# Patient Record
Sex: MALE | Race: WHITE | NOT HISPANIC OR LATINO | URBAN - METROPOLITAN AREA
[De-identification: names, ages, dates, MRNs, and addresses within clinical notes are randomized per-mention and may not be internally consistent; named-entity substitution may affect disease eponyms.]

---

## 2018-10-15 ENCOUNTER — INPATIENT (INPATIENT)
Facility: HOSPITAL | Age: 53
LOS: 1 days | Discharge: HOME | End: 2018-10-17
Attending: UROLOGY | Admitting: UROLOGY
Payer: COMMERCIAL

## 2018-10-15 VITALS
OXYGEN SATURATION: 98 % | HEART RATE: 83 BPM | RESPIRATION RATE: 18 BRPM | DIASTOLIC BLOOD PRESSURE: 103 MMHG | SYSTOLIC BLOOD PRESSURE: 177 MMHG | HEIGHT: 73 IN | TEMPERATURE: 98 F | WEIGHT: 274.92 LBS

## 2018-10-15 LAB
ALBUMIN SERPL ELPH-MCNC: 4.8 G/DL — SIGNIFICANT CHANGE UP (ref 3.5–5.2)
ALP SERPL-CCNC: 85 U/L — SIGNIFICANT CHANGE UP (ref 30–115)
ALT FLD-CCNC: 85 U/L — HIGH (ref 0–41)
ANION GAP SERPL CALC-SCNC: 13 MMOL/L — SIGNIFICANT CHANGE UP (ref 7–14)
APPEARANCE UR: CLEAR — SIGNIFICANT CHANGE UP
AST SERPL-CCNC: 51 U/L — HIGH (ref 0–41)
BACTERIA # UR AUTO: ABNORMAL
BASOPHILS # BLD AUTO: 0.02 K/UL — SIGNIFICANT CHANGE UP (ref 0–0.2)
BASOPHILS NFR BLD AUTO: 0.2 % — SIGNIFICANT CHANGE UP (ref 0–1)
BILIRUB SERPL-MCNC: 1.2 MG/DL — SIGNIFICANT CHANGE UP (ref 0.2–1.2)
BILIRUB UR-MCNC: NEGATIVE — SIGNIFICANT CHANGE UP
BUN SERPL-MCNC: 23 MG/DL — HIGH (ref 10–20)
CALCIUM SERPL-MCNC: 9.6 MG/DL — SIGNIFICANT CHANGE UP (ref 8.5–10.1)
CHLORIDE SERPL-SCNC: 98 MMOL/L — SIGNIFICANT CHANGE UP (ref 98–110)
CO2 SERPL-SCNC: 25 MMOL/L — SIGNIFICANT CHANGE UP (ref 17–32)
COLOR SPEC: YELLOW — SIGNIFICANT CHANGE UP
CREAT SERPL-MCNC: 1.5 MG/DL — SIGNIFICANT CHANGE UP (ref 0.7–1.5)
DIFF PNL FLD: ABNORMAL
EOSINOPHIL # BLD AUTO: 0.04 K/UL — SIGNIFICANT CHANGE UP (ref 0–0.7)
EOSINOPHIL NFR BLD AUTO: 0.4 % — SIGNIFICANT CHANGE UP (ref 0–8)
GLUCOSE SERPL-MCNC: 111 MG/DL — HIGH (ref 70–99)
GLUCOSE UR QL: NEGATIVE MG/DL — SIGNIFICANT CHANGE UP
HCT VFR BLD CALC: 43.9 % — SIGNIFICANT CHANGE UP (ref 42–52)
HGB BLD-MCNC: 15.1 G/DL — SIGNIFICANT CHANGE UP (ref 14–18)
IMM GRANULOCYTES NFR BLD AUTO: 0.4 % — HIGH (ref 0.1–0.3)
KETONES UR-MCNC: ABNORMAL
LACTATE SERPL-SCNC: 1 MMOL/L — SIGNIFICANT CHANGE UP (ref 0.5–2.2)
LEUKOCYTE ESTERASE UR-ACNC: NEGATIVE — SIGNIFICANT CHANGE UP
LIDOCAIN IGE QN: 18 U/L — SIGNIFICANT CHANGE UP (ref 7–60)
LYMPHOCYTES # BLD AUTO: 0.82 K/UL — LOW (ref 1.2–3.4)
LYMPHOCYTES # BLD AUTO: 8.8 % — LOW (ref 20.5–51.1)
MCHC RBC-ENTMCNC: 29.5 PG — SIGNIFICANT CHANGE UP (ref 27–31)
MCHC RBC-ENTMCNC: 34.4 G/DL — SIGNIFICANT CHANGE UP (ref 32–37)
MCV RBC AUTO: 85.9 FL — SIGNIFICANT CHANGE UP (ref 80–94)
MONOCYTES # BLD AUTO: 0.79 K/UL — HIGH (ref 0.1–0.6)
MONOCYTES NFR BLD AUTO: 8.4 % — SIGNIFICANT CHANGE UP (ref 1.7–9.3)
NEUTROPHILS # BLD AUTO: 7.65 K/UL — HIGH (ref 1.4–6.5)
NEUTROPHILS NFR BLD AUTO: 81.8 % — HIGH (ref 42.2–75.2)
NITRITE UR-MCNC: NEGATIVE — SIGNIFICANT CHANGE UP
NRBC # BLD: 0 /100 WBCS — SIGNIFICANT CHANGE UP (ref 0–0)
PH UR: 6 — SIGNIFICANT CHANGE UP (ref 5–8)
PLATELET # BLD AUTO: 172 K/UL — SIGNIFICANT CHANGE UP (ref 130–400)
POTASSIUM SERPL-MCNC: 5 MMOL/L — SIGNIFICANT CHANGE UP (ref 3.5–5)
POTASSIUM SERPL-SCNC: 5 MMOL/L — SIGNIFICANT CHANGE UP (ref 3.5–5)
PROT SERPL-MCNC: 7.8 G/DL — SIGNIFICANT CHANGE UP (ref 6–8)
PROT UR-MCNC: NEGATIVE MG/DL — SIGNIFICANT CHANGE UP
RBC # BLD: 5.11 M/UL — SIGNIFICANT CHANGE UP (ref 4.7–6.1)
RBC # FLD: 11.9 % — SIGNIFICANT CHANGE UP (ref 11.5–14.5)
RBC CASTS # UR COMP ASSIST: SIGNIFICANT CHANGE UP /HPF
SODIUM SERPL-SCNC: 136 MMOL/L — SIGNIFICANT CHANGE UP (ref 135–146)
SP GR SPEC: 1.01 — SIGNIFICANT CHANGE UP (ref 1.01–1.03)
UROBILINOGEN FLD QL: 0.2 MG/DL — SIGNIFICANT CHANGE UP (ref 0.2–0.2)
WBC # BLD: 9.36 K/UL — SIGNIFICANT CHANGE UP (ref 4.8–10.8)
WBC # FLD AUTO: 9.36 K/UL — SIGNIFICANT CHANGE UP (ref 4.8–10.8)

## 2018-10-15 RX ORDER — SODIUM CHLORIDE 9 MG/ML
1000 INJECTION INTRAMUSCULAR; INTRAVENOUS; SUBCUTANEOUS
Qty: 0 | Refills: 0 | Status: DISCONTINUED | OUTPATIENT
Start: 2018-10-15 | End: 2018-10-16

## 2018-10-15 RX ORDER — KETOROLAC TROMETHAMINE 30 MG/ML
30 SYRINGE (ML) INJECTION EVERY 8 HOURS
Qty: 0 | Refills: 0 | Status: DISCONTINUED | OUTPATIENT
Start: 2018-10-15 | End: 2018-10-16

## 2018-10-15 RX ORDER — MORPHINE SULFATE 50 MG/1
6 CAPSULE, EXTENDED RELEASE ORAL ONCE
Qty: 0 | Refills: 0 | Status: DISCONTINUED | OUTPATIENT
Start: 2018-10-15 | End: 2018-10-15

## 2018-10-15 RX ORDER — SODIUM CHLORIDE 9 MG/ML
1000 INJECTION INTRAMUSCULAR; INTRAVENOUS; SUBCUTANEOUS ONCE
Qty: 0 | Refills: 0 | Status: COMPLETED | OUTPATIENT
Start: 2018-10-15 | End: 2018-10-15

## 2018-10-15 RX ADMIN — MORPHINE SULFATE 6 MILLIGRAM(S): 50 CAPSULE, EXTENDED RELEASE ORAL at 16:57

## 2018-10-15 RX ADMIN — Medication 30 MILLIGRAM(S): at 18:32

## 2018-10-15 RX ADMIN — MORPHINE SULFATE 6 MILLIGRAM(S): 50 CAPSULE, EXTENDED RELEASE ORAL at 15:48

## 2018-10-15 RX ADMIN — SODIUM CHLORIDE 1000 MILLILITER(S): 9 INJECTION INTRAMUSCULAR; INTRAVENOUS; SUBCUTANEOUS at 18:23

## 2018-10-15 RX ADMIN — SODIUM CHLORIDE 2000 MILLILITER(S): 9 INJECTION INTRAMUSCULAR; INTRAVENOUS; SUBCUTANEOUS at 15:14

## 2018-10-15 NOTE — ED ADULT TRIAGE NOTE - CHIEF COMPLAINT QUOTE
The past few days I have had lower back pain, I have had kidney stones in the past and it feels like that. I took an oxycodone 300 at 1pm.

## 2018-10-15 NOTE — ED PROVIDER NOTE - PHYSICAL EXAMINATION
CONSTITUTIONAL: Well-developed; well-nourished; in no acute distress, nontoxic appearing  SKIN: skin exam is warm and dry,  HEAD: Normocephalic; atraumatic.  EYES:  conjunctiva and sclera clear.  ENT: MMM, no nasal congestion  NECK: Supple; non tender.  ROM intact.  CARD: S1, S2 normal, no murmur  RESP: No wheezes, rales or rhonchi. Good air movement bilaterally  ABD: soft; non-distended; non-tender. No Rebound, No guarding  EXT: no midline vertebral tenderness, no cva tenderness  NEURO: awake, alert, following commands, oriented, grossly unremarkable. No Focal deficits. GCS 15.   motor/sensaiton intact in bilateral lower ext.  PSYCH: Cooperative, appropriate. CONSTITUTIONAL: Well-developed; well-nourished; in no acute distress, nontoxic appearing  SKIN: skin exam is warm and dry,  HEAD: Normocephalic; atraumatic.  EYES:  conjunctiva and sclera clear.  ENT: MMM, no nasal congestion  NECK: Supple; non tender.  ROM intact.  CARD: S1, S2 normal, no murmur  RESP: No wheezes, rales or rhonchi. Good air movement bilaterally  ABD: soft; non-distended; non-tender. No Rebound, No guarding  :  no testicle tenderness, no scrotal erythema  EXT: no midline vertebral tenderness, no cva tenderness  NEURO: awake, alert, following commands, oriented, grossly unremarkable. No Focal deficits. GCS 15.   motor/sensaiton intact in bilateral lower ext.  PSYCH: Cooperative, appropriate.

## 2018-10-15 NOTE — H&P ADULT - NSHPLABSRESULTS_GEN_ALL_CORE
Vital Signs Last 24 Hrs  T(C): 36.9 (15 Oct 2018 14:20), Max: 36.9 (15 Oct 2018 14:20)  T(F): 98.5 (15 Oct 2018 14:20), Max: 98.5 (15 Oct 2018 14:20)  HR: 83 (15 Oct 2018 14:20) (83 - 83)  BP: 177/103 (15 Oct 2018 14:20) (177/103 - 177/103)  BP(mean): --  RR: 18 (15 Oct 2018 14:20) (18 - 18)  SpO2: 98% (15 Oct 2018 14:20) (98% - 98%)                            15.1   9.36  )-----------( 172      ( 15 Oct 2018 15:02 )             43.9       10-15    136  |  98  |  23<H>  ----------------------------<  111<H>  5.0   |  25  |  1.5    Ca    9.6      15 Oct 2018 15:02    TPro  7.8  /  Alb  4.8  /  TBili  1.2  /  DBili  x   /  AST  51<H>  /  ALT  85<H>  /  AlkPhos  85  1015              Urinalysis Basic - ( 15 Oct 2018 14:38 )    Color: Yellow / Appearance: Clear / S.010 / pH: x  Gluc: x / Ketone: Trace  / Bili: Negative / Urobili: 0.2 mg/dL   Blood: x / Protein: Negative mg/dL / Nitrite: Negative   Leuk Esterase: Negative / RBC: 1-2 /HPF / WBC x   Sq Epi: x / Non Sq Epi: x / Bacteria: Few            Lactate Trend  10-15 @ 15:02 Lactate:1.0           < from: CT Abdomen and Pelvis w/ IV Cont (10.15.18 @ 15:38) >     Delayed right nephrogram with mild right-sided perinephric   stranding and hydroureteronephrosis extending to the level of an   obstructing 8 x 6 x 10 mm right proximal ureteral calculus (approximately   375 Hounsfield units). Additional punctate nonobstructing left renal   lower pole calculus (series 4, image 188).    < end of copied text >

## 2018-10-15 NOTE — H&P ADULT - NSHPPHYSICALEXAM_GEN_ALL_CORE
Gen NAD, A&Ox3  CV +S1 and S2, RR  Resp +air entry B/L  Abd soft NT ND  Back No CVA tenderness  Ext no edema

## 2018-10-15 NOTE — H&P ADULT - HISTORY OF PRESENT ILLNESS
Pt is a 54 y/o male who presented to ED with 2 day history of right flank pain. Denies nausea/vomiting, fever/chills or other complaints.  Pt reports an isolated hx of nephrolithiasis, which he passed spontaneously without intervention. He denies urological follow up since.

## 2018-10-15 NOTE — ED PROVIDER NOTE - OBJECTIVE STATEMENT
52 yo m with pmh of kidney stones presents with right flank pain for a few days. no trauma, no fevers, no chills, no dysuria.  no abd pain, no numbness, no weakness.  no urinary complaints.  no current testicular pain.  no numbness, no weakness.  no urinary incontinence.

## 2018-10-15 NOTE — ED PROVIDER NOTE - PROGRESS NOTE DETAILS
a/p:  right flank pain.  p;  ct, labs, ivf, reassess.  pt took his own pain meds prior to arrival which has helped his pain. I spoke to urology PA who will come to see pt. pt seen by urology and accepted for admission

## 2018-10-15 NOTE — H&P ADULT - ASSESSMENT
Pt is a 52 y/o male with an obstructing Rt Proximal Ureteral Stone    Admit to Urology  IVF/Pain Management with Toradol  Strain urine  NPO p MN  For OR in am  Labs in am

## 2018-10-15 NOTE — ED ADULT NURSE NOTE - NSIMPLEMENTINTERV_GEN_ALL_ED
Implemented All Universal Safety Interventions:  [place] to call system. Call bell, personal items and telephone within reach. Instruct patient to call for assistance. Room bathroom lighting operational. Non-slip footwear when patient is off stretcher. Physically safe environment: no spills, clutter or unnecessary equipment. Stretcher in lowest position, wheels locked, appropriate side rails in place.

## 2018-10-15 NOTE — ED PROVIDER NOTE - NS ED ROS FT
Constitutional:  no fevers, no chills, no malaise  ENMT: No neck pain or stiffness, no nasal congestion, no ear pain, no throat pain  Cardiac:  No chest pain  Respiratory:  No cough or sob  GI:  see hpi  :  No dysuria, frequency or burning.  MS:  see hpi  Neuro:  No headache, no dizziness, no change in mental status  Skin:  No skin rash  Except as documented in the HPI,  all other systems are negative

## 2018-10-15 NOTE — ED ADULT NURSE REASSESSMENT NOTE - NS ED NURSE REASSESS COMMENT FT1
Notified Dr Reynolds of vital signs. Pt states his pain decreased after the morphine. No acute distress noted at this time. Will re evaluate.

## 2018-10-15 NOTE — ED PROVIDER NOTE - MEDICAL DECISION MAKING DETAILS
pt with large obstructing kidney stone.  pt seen by urology and accepted for admission to urology service.  no uti.  pt comfortable with plan.  pt admitted to urology with plan to go to OR tomorrow.  pt aware.

## 2018-10-16 LAB
ANION GAP SERPL CALC-SCNC: 14 MMOL/L — SIGNIFICANT CHANGE UP (ref 7–14)
BUN SERPL-MCNC: 21 MG/DL — HIGH (ref 10–20)
CALCIUM SERPL-MCNC: 8.8 MG/DL — SIGNIFICANT CHANGE UP (ref 8.5–10.1)
CHLORIDE SERPL-SCNC: 98 MMOL/L — SIGNIFICANT CHANGE UP (ref 98–110)
CO2 SERPL-SCNC: 23 MMOL/L — SIGNIFICANT CHANGE UP (ref 17–32)
CREAT SERPL-MCNC: 1.4 MG/DL — SIGNIFICANT CHANGE UP (ref 0.7–1.5)
GLUCOSE SERPL-MCNC: 100 MG/DL — HIGH (ref 70–99)
HCT VFR BLD CALC: 41.5 % — LOW (ref 42–52)
HGB BLD-MCNC: 13.9 G/DL — LOW (ref 14–18)
MCHC RBC-ENTMCNC: 29.3 PG — SIGNIFICANT CHANGE UP (ref 27–31)
MCHC RBC-ENTMCNC: 33.5 G/DL — SIGNIFICANT CHANGE UP (ref 32–37)
MCV RBC AUTO: 87.4 FL — SIGNIFICANT CHANGE UP (ref 80–94)
NRBC # BLD: 0 /100 WBCS — SIGNIFICANT CHANGE UP (ref 0–0)
PLATELET # BLD AUTO: 148 K/UL — SIGNIFICANT CHANGE UP (ref 130–400)
POTASSIUM SERPL-MCNC: 4.7 MMOL/L — SIGNIFICANT CHANGE UP (ref 3.5–5)
POTASSIUM SERPL-SCNC: 4.7 MMOL/L — SIGNIFICANT CHANGE UP (ref 3.5–5)
RBC # BLD: 4.75 M/UL — SIGNIFICANT CHANGE UP (ref 4.7–6.1)
RBC # FLD: 12.1 % — SIGNIFICANT CHANGE UP (ref 11.5–14.5)
SODIUM SERPL-SCNC: 135 MMOL/L — SIGNIFICANT CHANGE UP (ref 135–146)
WBC # BLD: 7.03 K/UL — SIGNIFICANT CHANGE UP (ref 4.8–10.8)
WBC # FLD AUTO: 7.03 K/UL — SIGNIFICANT CHANGE UP (ref 4.8–10.8)

## 2018-10-16 PROCEDURE — 52005 CYSTO W/URTRL CATHJ: CPT

## 2018-10-16 PROCEDURE — 74420 UROGRAPHY RTRGR +-KUB: CPT | Mod: 26

## 2018-10-16 RX ORDER — CEFAZOLIN SODIUM 1 G
2000 VIAL (EA) INJECTION
Qty: 0 | Refills: 0 | Status: COMPLETED | OUTPATIENT
Start: 2018-10-17 | End: 2018-10-17

## 2018-10-16 RX ORDER — ONDANSETRON 8 MG/1
4 TABLET, FILM COATED ORAL ONCE
Qty: 0 | Refills: 0 | Status: DISCONTINUED | OUTPATIENT
Start: 2018-10-16 | End: 2018-10-17

## 2018-10-16 RX ORDER — OXYCODONE AND ACETAMINOPHEN 5; 325 MG/1; MG/1
1 TABLET ORAL ONCE
Qty: 0 | Refills: 0 | Status: DISCONTINUED | OUTPATIENT
Start: 2018-10-16 | End: 2018-10-17

## 2018-10-16 RX ORDER — PHENAZOPYRIDINE HCL 100 MG
200 TABLET ORAL ONCE
Qty: 0 | Refills: 0 | Status: COMPLETED | OUTPATIENT
Start: 2018-10-16 | End: 2018-10-16

## 2018-10-16 RX ORDER — SODIUM CHLORIDE 9 MG/ML
1000 INJECTION, SOLUTION INTRAVENOUS
Qty: 0 | Refills: 0 | Status: DISCONTINUED | OUTPATIENT
Start: 2018-10-16 | End: 2018-10-17

## 2018-10-16 RX ORDER — HYDROMORPHONE HYDROCHLORIDE 2 MG/ML
1 INJECTION INTRAMUSCULAR; INTRAVENOUS; SUBCUTANEOUS
Qty: 0 | Refills: 0 | Status: DISCONTINUED | OUTPATIENT
Start: 2018-10-16 | End: 2018-10-17

## 2018-10-16 RX ORDER — ACETAMINOPHEN 500 MG
650 TABLET ORAL ONCE
Qty: 0 | Refills: 0 | Status: DISCONTINUED | OUTPATIENT
Start: 2018-10-16 | End: 2018-10-17

## 2018-10-16 RX ADMIN — Medication 30 MILLIGRAM(S): at 13:32

## 2018-10-16 RX ADMIN — Medication 30 MILLIGRAM(S): at 16:33

## 2018-10-16 RX ADMIN — Medication 200 MILLIGRAM(S): at 18:13

## 2018-10-16 RX ADMIN — Medication 30 MILLIGRAM(S): at 05:39

## 2018-10-16 RX ADMIN — SODIUM CHLORIDE 100 MILLILITER(S): 9 INJECTION, SOLUTION INTRAVENOUS at 18:14

## 2018-10-16 RX ADMIN — HYDROMORPHONE HYDROCHLORIDE 1 MILLIGRAM(S): 2 INJECTION INTRAMUSCULAR; INTRAVENOUS; SUBCUTANEOUS at 23:08

## 2018-10-16 RX ADMIN — HYDROMORPHONE HYDROCHLORIDE 1 MILLIGRAM(S): 2 INJECTION INTRAMUSCULAR; INTRAVENOUS; SUBCUTANEOUS at 19:40

## 2018-10-16 RX ADMIN — HYDROMORPHONE HYDROCHLORIDE 1 MILLIGRAM(S): 2 INJECTION INTRAMUSCULAR; INTRAVENOUS; SUBCUTANEOUS at 23:40

## 2018-10-16 RX ADMIN — HYDROMORPHONE HYDROCHLORIDE 1 MILLIGRAM(S): 2 INJECTION INTRAMUSCULAR; INTRAVENOUS; SUBCUTANEOUS at 20:08

## 2018-10-16 NOTE — BRIEF OPERATIVE NOTE - PROCEDURE
<<-----Click on this checkbox to enter Procedure Cystoscopy with ureteral catheterization or insertion of stent  10/16/2018  right CARMENCITAJ  Active  RIVERA

## 2018-10-16 NOTE — BRIEF OPERATIVE NOTE - POST-OP DX
Hydronephrosis with urinary obstruction due to ureteral calculus  10/16/2018    Active  Stephanie Rodrigez  Right ureteral calculus  10/16/2018    Active  Stephanie Rodrigez

## 2018-10-16 NOTE — BRIEF OPERATIVE NOTE - OPERATION/FINDINGS
cloudy blood tinged urine with hydrodrip  Grade 2 trabeculation  elevated median bar small lateral lobes Prostate about 15 grams-2 cm

## 2018-10-16 NOTE — BRIEF OPERATIVE NOTE - PRE-OP DX
Hydronephrosis concurrent with and due to calculi of kidney and ureter  10/16/2018  right  Active  Stephanie Rodrigez  Right ureteral calculus  10/16/2018    Active  Stephanie Rodrigez

## 2018-10-17 ENCOUNTER — TRANSCRIPTION ENCOUNTER (OUTPATIENT)
Age: 53
End: 2018-10-17

## 2018-10-17 VITALS — HEART RATE: 92 BPM | SYSTOLIC BLOOD PRESSURE: 160 MMHG | DIASTOLIC BLOOD PRESSURE: 87 MMHG

## 2018-10-17 DIAGNOSIS — N20.0 CALCULUS OF KIDNEY: ICD-10-CM

## 2018-10-17 DIAGNOSIS — I10 ESSENTIAL (PRIMARY) HYPERTENSION: ICD-10-CM

## 2018-10-17 DIAGNOSIS — E66.9 OBESITY, UNSPECIFIED: ICD-10-CM

## 2018-10-17 RX ORDER — AMLODIPINE BESYLATE 2.5 MG/1
1 TABLET ORAL
Qty: 14 | Refills: 0 | OUTPATIENT
Start: 2018-10-17

## 2018-10-17 RX ORDER — CEFUROXIME AXETIL 250 MG
1 TABLET ORAL
Qty: 14 | Refills: 0 | OUTPATIENT
Start: 2018-10-17 | End: 2018-10-23

## 2018-10-17 RX ORDER — AMLODIPINE BESYLATE 2.5 MG/1
5 TABLET ORAL DAILY
Qty: 0 | Refills: 0 | Status: DISCONTINUED | OUTPATIENT
Start: 2018-10-17 | End: 2018-10-17

## 2018-10-17 RX ORDER — METOPROLOL TARTRATE 50 MG
25 TABLET ORAL
Qty: 0 | Refills: 0 | Status: DISCONTINUED | OUTPATIENT
Start: 2018-10-17 | End: 2018-10-17

## 2018-10-17 RX ORDER — ACETAMINOPHEN 500 MG
2 TABLET ORAL
Qty: 0 | Refills: 0 | COMMUNITY
Start: 2018-10-17

## 2018-10-17 RX ADMIN — Medication 100 MILLIGRAM(S): at 00:50

## 2018-10-17 RX ADMIN — AMLODIPINE BESYLATE 5 MILLIGRAM(S): 2.5 TABLET ORAL at 08:24

## 2018-10-17 RX ADMIN — Medication 100 MILLIGRAM(S): at 10:19

## 2018-10-17 NOTE — PROGRESS NOTE ADULT - ASSESSMENT
patient is s/p right lithotripsy and stent placement, flowers taken out and patient urinated (reported burning sensation)  -called in for Medicine consult for new onset Hypertension  -case discussed with patient. He needs follow up in the community with PMD and BP logs for appropriate BP meds and dosasges, he may even need two BP meds, but would recommend one BP med to be started (5mg norvasc given, will increase it to 10mg); also hydralazine 25mg three times a day prn SBP >170 can be added for better BP control.  Elevated blood pressure could also be secondary to pain and other factors.   -patient is ambulating good and is awaiting to be discharged   -repeat SBP at bedside around 171.

## 2018-10-17 NOTE — PROGRESS NOTE ADULT - PROBLEM SELECTOR PLAN 1
-new onset (asymptomatic with no blurry vision/dizziness)  -Pt has not followed up with PMD  -needs close BP monitoring in the community with Bp logs  -start 10mg norvasc for now; may add hydralazine 25mg three times a day prn SBP above 170  -outpatient echocardiogram

## 2018-10-17 NOTE — PROGRESS NOTE ADULT - ASSESSMENT
POD #1 s/p right lithotripsy/ureteral stent placement; r/o new onset hypertension     - Case D/W Dr. Rodrigez: will request medicine consult in light of high BP's   - continue norvasc for now   - D/C flowers   - will D/C home when stable with PO cephalosporin; pt to call office this week for urine culture results and F/U for stent removal

## 2018-10-17 NOTE — DISCHARGE NOTE ADULT - PLAN OF CARE
resolution of pain Call office at the end of this week to follow up on urine culture results. Also make an appointment fo rnext week for stent removal better blood pressure control Please follow up with medical doctor this week to re-evaluate your new blood pressure medications. If you experience headaches or dizziness, go to ER

## 2018-10-17 NOTE — DISCHARGE NOTE ADULT - CARE PROVIDER_API CALL
Stephanie Rodrigez), Urology  [address]  [address]  [place] [address]  Phone: [phone]  Fax: [phone]    Chandu Kimble (MD), [facility]  [address] [place] [address]  Phone: [phone]  Fax: [phone]

## 2018-10-17 NOTE — PROGRESS NOTE ADULT - SUBJECTIVE AND OBJECTIVE BOX
S: Pt doing well; no complaints. Has high BP this am - started on norvasc, which was just give. Flowers in place  O; Vital Signs Last 24 Hrs  T(C): 36.2 (17 Oct 2018 06:33), Max: 36.8 (16 Oct 2018 14:59)  T(F): 97.2 (17 Oct 2018 06:33), Max: 98.3 (16 Oct 2018 17:55)  HR: 77 (17 Oct 2018 08:28) (71 - 87)  BP: 181/94 (17 Oct 2018 08:28) (152/82 - 193/96)  BP(mean): --  RR: 16 (17 Oct 2018 06:33) (10 - 22)  SpO2: 97% (16 Oct 2018 20:00) (94% - 97%)    I&O's Detail    16 Oct 2018 07:01  -  17 Oct 2018 07:00  --------------------------------------------------------  IN:    IV PiggyBack: 50 mL    lactated ringers.: 1000 mL    Oral Fluid: 120 mL  Total IN: 1170 mL    OUT:    Voided: 2675 mL (via flowers, pink tinged urine)  Total OUT: 2675 mL    Total NET: -1505 mL    exam:  lungs: cta  cvs: s1s2  abd: soft NT/ND, no Rt CVAT      labs: pending

## 2018-10-17 NOTE — PROGRESS NOTE ADULT - SUBJECTIVE AND OBJECTIVE BOX
CELIA BAUER  53y  Male      Patient is a 53y old  Male who presents with a chief complaint of Rt Flank Pain (17 Oct 2018 08:48)      INTERVAL HPI/OVERNIGHT EVENTS:  patient is s/p right lithotripsy and stent placement, flowers taken out and patient urinated (reported burning sensation)  -called in for Medicine consult for new onset Hypertension  -case discussed with patient. He needs follow up in the community with PMD and BP logs for appropriate BP meds and dosasges, he may even need two BP meds, but would recommend one BP med to be started (5mg norvasc given, will increase it to 10mg); also hydralazine 25mg three times a day prn SBP >170 can be added for better BP control.  Elevated blood pressure could also be secondary to pain and other factors.   -patient is ambulating good and is awaiting to be discharged   -repeat SBP at bedside around 171.    REVIEW OF SYSTEMS:  -denies any dizziness/blurry vision and or headaches at present     Vital Signs Last 24 Hrs  T(C): 36.2 (17 Oct 2018 06:33), Max: 36.8 (16 Oct 2018 14:59)  T(F): 97.2 (17 Oct 2018 06:33), Max: 98.3 (16 Oct 2018 17:55)  HR: 107 (17 Oct 2018 09:46) (71 - 107)  BP: 171/88 (17 Oct 2018 09:46) (152/82 - 193/96)  BP(mean): --  RR: 16 (17 Oct 2018 08:54) (10 - 22)  SpO2: 95% (17 Oct 2018 08:54) (94% - 97%)    PHYSICAL EXAM:  GENERAL: NAD, well-groomed, well-developed  HEAD:  Atraumatic, Normocephalic  EYES: EOMI, PERRLA, conjunctiva and sclera clear  NERVOUS SYSTEM:  Alert & Oriented X 4, Good concentration; Motor Strength 5/5 B/L upper and lower extremities; DTRs 2+ intact and symmetric  CHEST/LUNG: Clear to percussion bilaterally; No rales, rhonchi, wheezing, or rubs  CV/HEART: Regular rate and rhythm; No murmurs, rubs, or gallops  GI/ABDOMEN: Soft, obese abdomen   EXTREMITIES:  2+ Peripheral Pulses, No clubbing, cyanosis, or edema  SKIN: No rashes or lesions    LAB:                        13.9   7.03  )-----------( 148      ( 16 Oct 2018 07:00 )             41.5     10-16    135  |  98  |  21<H>  ----------------------------<  100<H>  4.7   |  23  |  1.4    Ca    8.8      16 Oct 2018 07:00    TPro  7.8  /  Alb  4.8  /  TBili  1.2  /  DBili  x   /  AST  51<H>  /  ALT  85<H>  /  AlkPhos  85  10-15        Daily Height in cm: 185.42 (16 Oct 2018 16:52)    Daily   CAPILLARY BLOOD GLUCOSE        Urinalysis Basic - ( 15 Oct 2018 14:38 )    Color: Yellow / Appearance: Clear / S.010 / pH: x  Gluc: x / Ketone: Trace  / Bili: Negative / Urobili: 0.2 mg/dL   Blood: x / Protein: Negative mg/dL / Nitrite: Negative   Leuk Esterase: Negative / RBC: 1-2 /HPF / WBC x   Sq Epi: x / Non Sq Epi: x / Bacteria: Few      LIVER FUNCTIONS - ( 15 Oct 2018 15:02 )  Alb: 4.8 g/dL / Pro: 7.8 g/dL / ALK PHOS: 85 U/L / ALT: 85 U/L / AST: 51 U/L / GGT: x               RADIOLOGY:    Imaging Personally Reviewed:  [ ] YES  [ ] NO    HEALTH ISSUES - PROBLEM Dx:        MEDS:  acetaminophen   Tablet .. 650 milliGRAM(s) Oral once PRN  amLODIPine   Tablet 5 milliGRAM(s) Oral daily  ceFAZolin   IVPB 2000 milliGRAM(s) IV Intermittent <User Schedule>  HYDROmorphone  Injectable 1 milliGRAM(s) IV Push every 10 minutes PRN  lactated ringers. 1000 milliLiter(s) IV Continuous <Continuous>  ondansetron Injectable 4 milliGRAM(s) IV Push once PRN  oxyCODONE    5 mG/acetaminophen 325 mG 1 Tablet(s) Oral once PRN

## 2018-10-17 NOTE — DISCHARGE NOTE ADULT - MEDICATION SUMMARY - MEDICATIONS TO TAKE
I will START or STAY ON the medications listed below when I get home from the hospital:    acetaminophen 325 mg oral tablet  -- 2 tab(s) by mouth once, As needed, Mild Pain (1 - 3)  -- Indication: For pain    Norvasc 10 mg oral tablet  -- 1 tab(s) by mouth once a day   -- It is very important that you take or use this exactly as directed.  Do not skip doses or discontinue unless directed by your doctor.  Some non-prescription drugs may aggravate your condition.  Read all labels carefully.  If a warning appears, check with your doctor before taking.    -- Indication: For Hypertension    cefuroxime 500 mg oral tablet  -- 1 tab(s) by mouth 2 times a day   -- Finish all this medication unless otherwise directed by prescriber.  Medication should be taken with plenty of water.  Take with food or milk.    -- Indication: For Kidney stone/antibiotic

## 2018-10-17 NOTE — DISCHARGE NOTE ADULT - CARE PLAN
Principal Discharge DX:	Kidney stone  Goal:	resolution of pain  Assessment and plan of treatment:	Call office at the end of this week to follow up on urine culture results. Also make an appointment fo rnext week for stent removal  Secondary Diagnosis:	Hypertension  Goal:	better blood pressure control  Assessment and plan of treatment:	Please follow up with medical doctor this week to re-evaluate your new blood pressure medications. If you experience headaches or dizziness, go to ER

## 2018-10-17 NOTE — DISCHARGE NOTE ADULT - PATIENT PORTAL LINK FT
You can access the [facility] Patient Portal, offered by [facility], by registering with the following website: [email or web address]

## 2018-10-17 NOTE — DISCHARGE NOTE ADULT - HOSPITAL COURSE
Pt is a 54 y/o male who presented to ED with 2 day history of right flank pain. Denies nausea/vomiting, fever/chills or other complaints.  He was diagnosed with proximal right ureteral stone for which he had a ureteral stent placed on 10/16. He was also noted to have episodes of high blood pressure and started on Norvasc with good results. He was evaluated by hospitalist who recommended that pt continue norvasc upon discharge and be re-evaluted by PMD for further recommendations.

## 2018-10-18 PROBLEM — N20.0 CALCULUS OF KIDNEY: Chronic | Status: ACTIVE | Noted: 2018-10-15

## 2018-10-18 PROBLEM — Z00.00 ENCOUNTER FOR PREVENTIVE HEALTH EXAMINATION: Status: ACTIVE | Noted: 2018-10-18

## 2018-10-23 DIAGNOSIS — N13.2 HYDRONEPHROSIS WITH RENAL AND URETERAL CALCULOUS OBSTRUCTION: ICD-10-CM

## 2018-10-23 DIAGNOSIS — N20.0 CALCULUS OF KIDNEY: ICD-10-CM

## 2018-10-23 DIAGNOSIS — I10 ESSENTIAL (PRIMARY) HYPERTENSION: ICD-10-CM

## 2018-10-23 DIAGNOSIS — E66.9 OBESITY, UNSPECIFIED: ICD-10-CM

## 2018-10-25 ENCOUNTER — TRANSCRIPTION ENCOUNTER (OUTPATIENT)
Age: 53
End: 2018-10-25

## 2018-10-25 ENCOUNTER — APPOINTMENT (OUTPATIENT)
Dept: UROLOGY | Facility: CLINIC | Age: 53
End: 2018-10-25
Payer: COMMERCIAL

## 2018-10-25 VITALS
HEART RATE: 85 BPM | BODY MASS INDEX: 36.45 KG/M2 | SYSTOLIC BLOOD PRESSURE: 167 MMHG | HEIGHT: 73 IN | WEIGHT: 275 LBS | DIASTOLIC BLOOD PRESSURE: 96 MMHG

## 2018-10-25 DIAGNOSIS — Z78.9 OTHER SPECIFIED HEALTH STATUS: ICD-10-CM

## 2018-10-25 DIAGNOSIS — Z81.8 FAMILY HISTORY OF OTHER MENTAL AND BEHAVIORAL DISORDERS: ICD-10-CM

## 2018-10-25 DIAGNOSIS — I10 ESSENTIAL (PRIMARY) HYPERTENSION: ICD-10-CM

## 2018-10-25 PROCEDURE — 99214 OFFICE O/P EST MOD 30 MIN: CPT

## 2018-10-25 RX ORDER — AMOXICILLIN AND CLAVULANATE POTASSIUM 875; 125 MG/1; MG/1
875-125 TABLET, COATED ORAL
Qty: 20 | Refills: 0 | Status: COMPLETED | COMMUNITY
Start: 2018-07-17

## 2018-10-25 RX ORDER — AMLODIPINE BESYLATE 10 MG/1
10 TABLET ORAL
Qty: 14 | Refills: 0 | Status: ACTIVE | COMMUNITY
Start: 2018-10-17

## 2018-10-25 NOTE — PHYSICAL EXAM
[General Appearance - Well Developed] : well developed [General Appearance - Well Nourished] : well nourished [Normal Appearance] : normal appearance [Well Groomed] : well groomed [General Appearance - In No Acute Distress] : no acute distress [Heart Rate And Rhythm] : Heart rate and rhythm were normal [Edema] : no peripheral edema [Respiration, Rhythm And Depth] : normal respiratory rhythm and effort [Exaggerated Use Of Accessory Muscles For Inspiration] : no accessory muscle use [Auscultation Breath Sounds / Voice Sounds] : lungs were clear to auscultation bilaterally [Bowel Sounds] : normal bowel sounds [Abdomen Soft] : soft [Abdomen Tenderness] : non-tender [Costovertebral Angle Tenderness] : no ~M costovertebral angle tenderness [Urinary Bladder Findings] : the bladder was normal on palpation [Normal Station and Gait] : the gait and station were normal for the patient's age [] : no rash [No Focal Deficits] : no focal deficits [Oriented To Time, Place, And Person] : oriented to person, place, and time [Affect] : the affect was normal [Mood] : the mood was normal [Not Anxious] : not anxious [FreeTextEntry1] : Obese

## 2018-10-25 NOTE — HISTORY OF PRESENT ILLNESS
[Currently Experiencing ___] :  [unfilled] [FreeTextEntry1] : Carlos is a 53-year-old male who presented to the emergency room at [facility] for evaluation of right flank pain, 10/10 on a pain scale. A CT scan revealed hydroureteronephrosis to the level of an obstructing 8 x 6 x 10 mm right proximal ureteral calculus (approximately 375 HU). A right ureteral stent was placed and pus was noted. He was placed on cefuroxime 500 mg p.o. b.i.d. and D/C home to review his options\par Today, he denies any significant discomfort from his right ureteral stent, fever, chills, gross hematuria, dysuria, or further constitutional symptoms.

## 2018-10-25 NOTE — LETTER BODY
[Dear  ___] : Dear  [unfilled], [Consult Letter:] : I had the pleasure of evaluating your patient, [unfilled]. [Please see my note below.] : Please see my note below. [Sincerely,] : Sincerely, [FreeTextEntry2] : Dr. Rajeev Kimble\par Internal medicine\par [address] [facility] [facility]\[place] [address]\par [phone]

## 2018-10-25 NOTE — LETTER HEADER
[FreeTextEntry3] : Stephanie Rodrigez M.D.\par Director of Urology\par [facility]\par [address] [place] [address], [address]\[place] [address]

## 2018-10-25 NOTE — ASSESSMENT
[FreeTextEntry1] : We reviewed his prior films and urine culture.\par \par He is not bothered by a stent and Hounsfield units of the stone suggest to a large extent uric acid . We discussed laser lithotripsy however, he is not eager for surgical intervention at this time. After review of the options available he has elected to begin Urocit-K 10 mEq 2 tablets t.i.d., titrating based on a pH of his urine, (6-7) to see if we can dissolve the stone. We'll obtain a renal sonogram now to see if we can see it before medication and again pre followup in 3 weeks. If we can see the stone on sonogram we will watch if not we will obtain a CT scan. He understands if he develops any abdominal/flank pain, nausea, vomiting, chills, fever, or further constitutional symptoms, etc... he is to contact the office and go to the emergency room

## 2018-11-12 ENCOUNTER — FORM ENCOUNTER (OUTPATIENT)
Age: 53
End: 2018-11-12

## 2018-11-13 ENCOUNTER — OUTPATIENT (OUTPATIENT)
Dept: OUTPATIENT SERVICES | Facility: HOSPITAL | Age: 53
LOS: 1 days | Discharge: HOME | End: 2018-11-13

## 2018-11-13 DIAGNOSIS — N20.0 CALCULUS OF KIDNEY: ICD-10-CM

## 2018-11-16 ENCOUNTER — APPOINTMENT (OUTPATIENT)
Dept: UROLOGY | Facility: CLINIC | Age: 53
End: 2018-11-16
Payer: COMMERCIAL

## 2018-11-16 VITALS
WEIGHT: 275 LBS | BODY MASS INDEX: 36.45 KG/M2 | DIASTOLIC BLOOD PRESSURE: 93 MMHG | SYSTOLIC BLOOD PRESSURE: 160 MMHG | HEIGHT: 73 IN | HEART RATE: 101 BPM

## 2018-11-16 PROCEDURE — 99214 OFFICE O/P EST MOD 30 MIN: CPT

## 2018-11-16 NOTE — ASSESSMENT
[FreeTextEntry1] : The ultrasound showed no change.\par the CT had shown that the left HU = 245 and no change ergo it is unlikely that the right ureteral stone that was 450 HU would have dissolved\par \par It is unlikely that the left side hasn’t changed and that the right-sided stone is gone and we need to go in. I will order a low dose CAT scan as I really don’t want to take them into the OR pretty ureteroscope up and then find that the stone actually did dissolve but I’m not going to wait. We will schedule him for the ureteroscopy at the next available date get the CAT scan before that and have them call me.\par \par The risk benefits and alternatives to ureteroscopy was discussed at length as well as being given to him in writing. Because of his age he will need medical clearance and we will have him go through preadmission surgical testing\par

## 2018-11-16 NOTE — HISTORY OF PRESENT ILLNESS
[FreeTextEntry1] : On October 6, 2018 Carlos had a right double-J placed for treatment of cloudy urine upstream her in right upper ureteral stone. At that time he was noted to have a very small several millimeter left lower pole stone. The Hounsfield units on the left were 245 on the right about 450, 425 and we felt that if we could dissolve the uric acid component in the right stone by Urocit-K and perhaps the calcium component would fall apart and he be able to get away without ureteroscopy. He’s been on Urocit-K theoretically it to PO TID since October 25 had an ultrasound done on November 13 and is here today for follow-up.

## 2018-11-16 NOTE — LETTER BODY
[Dear  ___] : Dear  [unfilled], [Courtesy Letter:] : I had the pleasure of seeing your patient, [unfilled], in my office today. [Please see my note below.] : Please see my note below. [Sincerely,] : Sincerely, [FreeTextEntry2] : Rajeev Kimble MD\par [address]\[place] [address]

## 2018-11-16 NOTE — ADDENDUM
[FreeTextEntry1] : STEPHANIE RODRIGEZ M.D.\par [address] [place] [address], [address]\par [place], [place] [address]\par [phone]\par FAX#: [phone]\par \par 2018\par \par Dear LIZETTE, FRANK			: 1965;\par \par You have been scheduled for a cystoscopy, with a right retrograde pyelogram ureteroscopy laser lithotripsy and stone extraction with possible replacement of a right double J (the tube that runs from the bladder to the kidney to help keep the ureter open), at [facility] on Tuesday, November / December ___ 2018 at _________ am/p.m.  If possible avoid all ASA and ASA like products, etc., for the week before the procedure (that would include Coumadin or any other blood thinners as well.  If you are not sure ask me).  You should have received or been given a pre admission instruction sheet.  If not and or if you have any questions concerning what you should or should not do please contact me.\par You will most likely be leaving hospital the day of the procedure.  The catheter or double J as it is called will stay in until your problem (stone or other cause of obstruction) is taken care of.  We will or have already discussed when you can expect that to occur.  If you are not clear on that issue please talk to me.\par While the double J is inside you, you may have a discomfort in the penis or vagina and or the sensation of having to urinate.  If this occurs, it can occur with or without urine coming out.  This feeling is called a Bladder Spasm.  You might also have some burning with urination, or some pain in the flank.  The tube is a two-way pathway and sometimes the urine goes back up to the kidney and irritates it. These symptoms usually stop within a few days.  Though they can be very uncomfortable or even painful, they do not threaten your health.  If they do occur and bother you, please call me so I can help alleviate them to whatever extent possible.  You may also have some blood in the urine.  This rarely is so severe as to cause problems.  As long as you are able to urinate, drink lots of fluids and it should clear with time.\par 	If at any point you are concerned please Do Not Hesitate to call. If it is after normal business hours and it is urgent you can reach me via [phone]; (service can reach me at all times).  If it is not urgent please call the appropriate office during regular business hours.  As always if you are in doubt, I would rather you call unnecessarily than not call when you should have.  \par \par As always, I will keep you informed, and I thank you again for allowing me to participate in the care of this patient.\par \par Sincerely,\par \par Stephanie Rodrigez MD\par \par Stephanie Rodrigez MD\par Director of the Division of Urology, in the Department of Surgery\par [place], [place]\par \par PS: IF you have any ALLERGIES or any special requirements, such as special prescription blanks or you need the prescriptions in advance, please let me know.\par

## 2018-11-16 NOTE — LETTER HEADER
[FreeTextEntry3] : Stephanie Rodrigez M.D.\par Director of Urology\par [facility]\par [address] [place] [address], [address]\[place] [address]

## 2018-11-26 ENCOUNTER — FORM ENCOUNTER (OUTPATIENT)
Age: 53
End: 2018-11-26

## 2018-11-27 ENCOUNTER — OUTPATIENT (OUTPATIENT)
Dept: OUTPATIENT SERVICES | Facility: HOSPITAL | Age: 53
LOS: 1 days | Discharge: HOME | End: 2018-11-27

## 2018-11-27 ENCOUNTER — OTHER (OUTPATIENT)
Age: 53
End: 2018-11-27

## 2018-11-27 ENCOUNTER — FORM ENCOUNTER (OUTPATIENT)
Age: 53
End: 2018-11-27

## 2018-11-27 VITALS
TEMPERATURE: 97 F | OXYGEN SATURATION: 98 % | HEIGHT: 74 IN | RESPIRATION RATE: 18 BRPM | SYSTOLIC BLOOD PRESSURE: 147 MMHG | HEART RATE: 80 BPM | WEIGHT: 279.99 LBS | DIASTOLIC BLOOD PRESSURE: 85 MMHG

## 2018-11-27 DIAGNOSIS — Z01.818 ENCOUNTER FOR OTHER PREPROCEDURAL EXAMINATION: ICD-10-CM

## 2018-11-27 DIAGNOSIS — Z98.890 OTHER SPECIFIED POSTPROCEDURAL STATES: Chronic | ICD-10-CM

## 2018-11-27 DIAGNOSIS — N20.2 CALCULUS OF KIDNEY WITH CALCULUS OF URETER: ICD-10-CM

## 2018-11-27 LAB
ALBUMIN SERPL ELPH-MCNC: 4.9 G/DL — SIGNIFICANT CHANGE UP (ref 3.5–5.2)
ALP SERPL-CCNC: 101 U/L — SIGNIFICANT CHANGE UP (ref 30–115)
ALT FLD-CCNC: 52 U/L — HIGH (ref 0–41)
ANION GAP SERPL CALC-SCNC: 17 MMOL/L — HIGH (ref 7–14)
APPEARANCE UR: CLEAR — SIGNIFICANT CHANGE UP
APTT BLD: 32.7 SEC — SIGNIFICANT CHANGE UP (ref 27–39.2)
AST SERPL-CCNC: 35 U/L — SIGNIFICANT CHANGE UP (ref 0–41)
BACTERIA # UR AUTO: ABNORMAL /HPF
BASOPHILS # BLD AUTO: 0.03 K/UL — SIGNIFICANT CHANGE UP (ref 0–0.2)
BASOPHILS NFR BLD AUTO: 0.4 % — SIGNIFICANT CHANGE UP (ref 0–1)
BILIRUB SERPL-MCNC: 0.7 MG/DL — SIGNIFICANT CHANGE UP (ref 0.2–1.2)
BILIRUB UR-MCNC: NEGATIVE — SIGNIFICANT CHANGE UP
BUN SERPL-MCNC: 26 MG/DL — HIGH (ref 10–20)
CALCIUM SERPL-MCNC: 9.8 MG/DL — SIGNIFICANT CHANGE UP (ref 8.5–10.1)
CHLORIDE SERPL-SCNC: 92 MMOL/L — LOW (ref 98–110)
CO2 SERPL-SCNC: 28 MMOL/L — SIGNIFICANT CHANGE UP (ref 17–32)
COLOR SPEC: YELLOW — SIGNIFICANT CHANGE UP
CREAT SERPL-MCNC: 1.1 MG/DL — SIGNIFICANT CHANGE UP (ref 0.7–1.5)
DIFF PNL FLD: ABNORMAL
EOSINOPHIL # BLD AUTO: 0.13 K/UL — SIGNIFICANT CHANGE UP (ref 0–0.7)
EOSINOPHIL NFR BLD AUTO: 1.7 % — SIGNIFICANT CHANGE UP (ref 0–8)
GLUCOSE SERPL-MCNC: 93 MG/DL — SIGNIFICANT CHANGE UP (ref 70–99)
GLUCOSE UR QL: NEGATIVE MG/DL — SIGNIFICANT CHANGE UP
HCT VFR BLD CALC: 41.5 % — LOW (ref 42–52)
HGB BLD-MCNC: 14 G/DL — SIGNIFICANT CHANGE UP (ref 14–18)
IMM GRANULOCYTES NFR BLD AUTO: 0.6 % — HIGH (ref 0.1–0.3)
INR BLD: 1.05 RATIO — SIGNIFICANT CHANGE UP (ref 0.65–1.3)
KETONES UR-MCNC: NEGATIVE — SIGNIFICANT CHANGE UP
LEUKOCYTE ESTERASE UR-ACNC: ABNORMAL
LYMPHOCYTES # BLD AUTO: 1.2 K/UL — SIGNIFICANT CHANGE UP (ref 1.2–3.4)
LYMPHOCYTES # BLD AUTO: 15.5 % — LOW (ref 20.5–51.1)
MCHC RBC-ENTMCNC: 28.5 PG — SIGNIFICANT CHANGE UP (ref 27–31)
MCHC RBC-ENTMCNC: 33.7 G/DL — SIGNIFICANT CHANGE UP (ref 32–37)
MCV RBC AUTO: 84.3 FL — SIGNIFICANT CHANGE UP (ref 80–94)
MONOCYTES # BLD AUTO: 0.51 K/UL — SIGNIFICANT CHANGE UP (ref 0.1–0.6)
MONOCYTES NFR BLD AUTO: 6.6 % — SIGNIFICANT CHANGE UP (ref 1.7–9.3)
NEUTROPHILS # BLD AUTO: 5.83 K/UL — SIGNIFICANT CHANGE UP (ref 1.4–6.5)
NEUTROPHILS NFR BLD AUTO: 75.2 % — SIGNIFICANT CHANGE UP (ref 42.2–75.2)
NITRITE UR-MCNC: NEGATIVE — SIGNIFICANT CHANGE UP
NRBC # BLD: 0 /100 WBCS — SIGNIFICANT CHANGE UP (ref 0–0)
PH UR: 7 — SIGNIFICANT CHANGE UP (ref 5–8)
PLATELET # BLD AUTO: 206 K/UL — SIGNIFICANT CHANGE UP (ref 130–400)
POTASSIUM SERPL-MCNC: 4.3 MMOL/L — SIGNIFICANT CHANGE UP (ref 3.5–5)
POTASSIUM SERPL-SCNC: 4.3 MMOL/L — SIGNIFICANT CHANGE UP (ref 3.5–5)
PROT SERPL-MCNC: 7.9 G/DL — SIGNIFICANT CHANGE UP (ref 6–8)
PROT UR-MCNC: 30 MG/DL
PROTHROM AB SERPL-ACNC: 12.1 SEC — SIGNIFICANT CHANGE UP (ref 9.95–12.87)
RBC # BLD: 4.92 M/UL — SIGNIFICANT CHANGE UP (ref 4.7–6.1)
RBC # FLD: 12.6 % — SIGNIFICANT CHANGE UP (ref 11.5–14.5)
RBC CASTS # UR COMP ASSIST: ABNORMAL /HPF
SODIUM SERPL-SCNC: 137 MMOL/L — SIGNIFICANT CHANGE UP (ref 135–146)
SP GR SPEC: 1.02 — SIGNIFICANT CHANGE UP (ref 1.01–1.03)
UROBILINOGEN FLD QL: 1 MG/DL (ref 0.2–0.2)
WBC # BLD: 7.75 K/UL — SIGNIFICANT CHANGE UP (ref 4.8–10.8)
WBC # FLD AUTO: 7.75 K/UL — SIGNIFICANT CHANGE UP (ref 4.8–10.8)
WBC UR QL: SIGNIFICANT CHANGE UP /HPF

## 2018-11-27 NOTE — H&P PST ADULT - REASON FOR ADMISSION
54 yo M presents to [facility] for cystoscopy, right retrograde polygram, ureteroscopy under LSB by Dr. Rodrigez at [facility] on 12/4/2018.

## 2018-11-27 NOTE — H&P PST ADULT - HISTORY OF PRESENT ILLNESS
Patient was diagnosed of kidney stone on Oct 16 2018 and had an admission done and treated with IVF and cystoscopy done with right ureteral stent applied and f/u with urologist and f/u sonogram shown kidney stone at the Right ureters remains. Denies any Cp, sob, palpitations fever, cough or dysuria. Ex tolerance of 2 fos walk with out SOB. No KATE.

## 2018-11-28 ENCOUNTER — OUTPATIENT (OUTPATIENT)
Dept: OUTPATIENT SERVICES | Facility: HOSPITAL | Age: 53
LOS: 1 days | Discharge: HOME | End: 2018-11-28

## 2018-11-28 DIAGNOSIS — N20.0 CALCULUS OF KIDNEY: ICD-10-CM

## 2018-11-28 DIAGNOSIS — Z98.890 OTHER SPECIFIED POSTPROCEDURAL STATES: Chronic | ICD-10-CM

## 2018-11-28 LAB
CULTURE RESULTS: NO GROWTH — SIGNIFICANT CHANGE UP
SPECIMEN SOURCE: SIGNIFICANT CHANGE UP

## 2018-12-03 ENCOUNTER — RESULT REVIEW (OUTPATIENT)
Age: 53
End: 2018-12-03

## 2018-12-04 ENCOUNTER — APPOINTMENT (OUTPATIENT)
Dept: UROLOGY | Facility: HOSPITAL | Age: 53
End: 2018-12-04
Payer: COMMERCIAL

## 2018-12-04 ENCOUNTER — OUTPATIENT (OUTPATIENT)
Dept: OUTPATIENT SERVICES | Facility: HOSPITAL | Age: 53
LOS: 1 days | Discharge: HOME | End: 2018-12-04

## 2018-12-04 VITALS — SYSTOLIC BLOOD PRESSURE: 126 MMHG | DIASTOLIC BLOOD PRESSURE: 76 MMHG | HEART RATE: 76 BPM | RESPIRATION RATE: 16 BRPM

## 2018-12-04 VITALS
SYSTOLIC BLOOD PRESSURE: 133 MMHG | OXYGEN SATURATION: 99 % | HEIGHT: 74 IN | DIASTOLIC BLOOD PRESSURE: 79 MMHG | RESPIRATION RATE: 17 BRPM | WEIGHT: 270.07 LBS | TEMPERATURE: 97 F | HEART RATE: 97 BPM

## 2018-12-04 DIAGNOSIS — Z98.890 OTHER SPECIFIED POSTPROCEDURAL STATES: Chronic | ICD-10-CM

## 2018-12-04 PROCEDURE — 52353 CYSTOURETERO W/LITHOTRIPSY: CPT | Mod: RT

## 2018-12-04 RX ORDER — ONDANSETRON 8 MG/1
4 TABLET, FILM COATED ORAL ONCE
Qty: 0 | Refills: 0 | Status: DISCONTINUED | OUTPATIENT
Start: 2018-12-04 | End: 2018-12-04

## 2018-12-04 RX ORDER — LISINOPRIL 2.5 MG/1
1 TABLET ORAL
Qty: 0 | Refills: 0 | COMMUNITY

## 2018-12-04 RX ORDER — OXYCODONE AND ACETAMINOPHEN 5; 325 MG/1; MG/1
2 TABLET ORAL ONCE
Qty: 0 | Refills: 0 | Status: DISCONTINUED | OUTPATIENT
Start: 2018-12-04 | End: 2018-12-04

## 2018-12-04 RX ORDER — KETOROLAC TROMETHAMINE 30 MG/ML
30 SYRINGE (ML) INJECTION ONCE
Qty: 0 | Refills: 0 | Status: DISCONTINUED | OUTPATIENT
Start: 2018-12-04 | End: 2018-12-04

## 2018-12-04 RX ORDER — SODIUM CHLORIDE 9 MG/ML
1000 INJECTION, SOLUTION INTRAVENOUS
Qty: 0 | Refills: 0 | Status: DISCONTINUED | OUTPATIENT
Start: 2018-12-04 | End: 2018-12-04

## 2018-12-04 RX ADMIN — Medication 30 MILLIGRAM(S): at 11:56

## 2018-12-04 RX ADMIN — SODIUM CHLORIDE 100 MILLILITER(S): 9 INJECTION, SOLUTION INTRAVENOUS at 11:53

## 2018-12-04 NOTE — BRIEF OPERATIVE NOTE - PROCEDURE
<<-----Click on this checkbox to enter Procedure Ureteroscopy with laser lithotripsy and stent placement  12/04/2018  right  Active  RIVERA

## 2018-12-04 NOTE — BRIEF OPERATIVE NOTE - COMMENTS
stone was impacted peeled off with a balbuena broken up and numerous passes to get out the basketable fragments  2+ trabeculation

## 2018-12-04 NOTE — CHART NOTE - NSCHARTNOTEFT_GEN_A_CORE
PACU ANESTHESIA ADMISSION NOTE      Procedure: Ureteroscopy with laser lithotripsy and stent placement: right    Post op diagnosis:  Right ureteral calculus      ____  Intubated  TV:______       Rate: ______      FiO2: ______    _x___  Patent Airway    _x___  Full return of protective reflexes    __x__  Full recovery from anesthesia / back to baseline status    Vitals:  T(C): 36.7 (12-04-18 @ 11:34), Max: 36.7 (12-04-18 @ 11:34)  HR: 65 (12-04-18 @ 11:39) (65 - 97)  BP: 131/72 (12-04-18 @ 11:39) (131/72 - 141/75)  RR: 16 (12-04-18 @ 11:39) (15 - 17)  SpO2: 95% (12-04-18 @ 11:39) (95% - 99%)    Mental Status:  _x___ Awake   ____x_ Alert   _____ Drowsy   _____ Sedated    Nausea/Vomiting:  __x__ NO  ______Yes,   See Post - Op Orders          Pain Scale (0-10):  _____    Treatment: ____ xNone    ____ See Post - Op/PCA Orders    Post - Operative Fluids:   ____ Oral   __x__ See Post - Op Orders    Plan: Discharge:   _x___Home       _____Floor     _____Critical Care    _____  Other:_________________    Comments: uneventful anesthesia course no complications. VItals stable. Pt transferred to PACU

## 2018-12-04 NOTE — ASU DISCHARGE PLAN (ADULT/PEDIATRIC). - NOTIFY
Fever greater than 101/Unable to Urinate/Inability to Tolerate Liquids or Foods/Persistent Nausea and Vomiting/Pain not relieved by Medications/Bleeding that does not stop/Increased Irritability or Sluggishness

## 2018-12-06 PROBLEM — I10 ESSENTIAL (PRIMARY) HYPERTENSION: Chronic | Status: ACTIVE | Noted: 2018-11-27

## 2018-12-07 LAB — COMPN STONE: SIGNIFICANT CHANGE UP

## 2018-12-10 DIAGNOSIS — Z87.891 PERSONAL HISTORY OF NICOTINE DEPENDENCE: ICD-10-CM

## 2018-12-10 DIAGNOSIS — Z82.49 FAMILY HISTORY OF ISCHEMIC HEART DISEASE AND OTHER DISEASES OF THE CIRCULATORY SYSTEM: ICD-10-CM

## 2018-12-10 DIAGNOSIS — N20.1 CALCULUS OF URETER: ICD-10-CM

## 2018-12-10 DIAGNOSIS — N32.89 OTHER SPECIFIED DISORDERS OF BLADDER: ICD-10-CM

## 2018-12-10 DIAGNOSIS — I10 ESSENTIAL (PRIMARY) HYPERTENSION: ICD-10-CM

## 2018-12-12 ENCOUNTER — APPOINTMENT (OUTPATIENT)
Dept: UROLOGY | Facility: CLINIC | Age: 53
End: 2018-12-12

## 2018-12-27 ENCOUNTER — FORM ENCOUNTER (OUTPATIENT)
Age: 53
End: 2018-12-27

## 2018-12-27 ENCOUNTER — APPOINTMENT (OUTPATIENT)
Dept: UROLOGY | Facility: CLINIC | Age: 53
End: 2018-12-27

## 2018-12-28 ENCOUNTER — OUTPATIENT (OUTPATIENT)
Dept: OUTPATIENT SERVICES | Facility: HOSPITAL | Age: 53
LOS: 1 days | Discharge: HOME | End: 2018-12-28

## 2018-12-28 DIAGNOSIS — Z98.890 OTHER SPECIFIED POSTPROCEDURAL STATES: Chronic | ICD-10-CM

## 2018-12-28 DIAGNOSIS — N20.1 CALCULUS OF URETER: ICD-10-CM

## 2018-12-31 LAB
BILIRUB UR QL STRIP: NORMAL
CLARITY UR: NORMAL
COLLECTION METHOD: NORMAL
GLUCOSE UR-MCNC: NORMAL
HCG UR QL: NORMAL EU/DL
HGB UR QL STRIP.AUTO: 250
KETONES UR-MCNC: NORMAL
LEUKOCYTE ESTERASE UR QL STRIP: 75
NITRITE UR QL STRIP: NORMAL
PH UR STRIP: 5
PROT UR STRIP-MCNC: 100
SP GR UR STRIP: 1.02

## 2019-01-03 ENCOUNTER — APPOINTMENT (OUTPATIENT)
Dept: UROLOGY | Facility: CLINIC | Age: 54
End: 2019-01-03
Payer: COMMERCIAL

## 2019-01-03 VITALS
WEIGHT: 275 LBS | HEART RATE: 89 BPM | BODY MASS INDEX: 37.25 KG/M2 | DIASTOLIC BLOOD PRESSURE: 88 MMHG | HEIGHT: 72 IN | SYSTOLIC BLOOD PRESSURE: 164 MMHG

## 2019-01-03 LAB
BILIRUB UR QL STRIP: NORMAL
CLARITY UR: CLEAR
COLLECTION METHOD: NORMAL
GLUCOSE UR-MCNC: NORMAL
HCG UR QL: NORMAL EU/DL
HGB UR QL STRIP.AUTO: 250
KETONES UR-MCNC: NORMAL
LEUKOCYTE ESTERASE UR QL STRIP: NORMAL
NITRITE UR QL STRIP: NORMAL
PH UR STRIP: 6
PROT UR STRIP-MCNC: NORMAL
SP GR UR STRIP: 1

## 2019-01-03 PROCEDURE — 52310 CYSTOSCOPY AND TREATMENT: CPT

## 2019-01-03 PROCEDURE — 99213 OFFICE O/P EST LOW 20 MIN: CPT | Mod: 25

## 2019-01-03 PROCEDURE — 81003 URINALYSIS AUTO W/O SCOPE: CPT | Mod: QW

## 2019-01-03 RX ORDER — POTASSIUM CITRATE 10 MEQ/1
10 MEQ TABLET, EXTENDED RELEASE ORAL
Qty: 180 | Refills: 1 | Status: COMPLETED | COMMUNITY
Start: 2018-10-25 | End: 2019-01-03

## 2019-02-25 ENCOUNTER — APPOINTMENT (OUTPATIENT)
Dept: UROLOGY | Facility: CLINIC | Age: 54
End: 2019-02-25

## 2019-03-05 ENCOUNTER — TRANSCRIPTION ENCOUNTER (OUTPATIENT)
Age: 54
End: 2019-03-05

## 2019-04-02 NOTE — ED ADULT TRIAGE NOTE - WEIGHT IN LBS
(I10) Essential hypertension  (primary encounter diagnosis)  Comment: bp high.  Well add amlodipine 5 mg to lisinopril 20 mg.   Will have come in for nurse only BP check x 2 and then follow-up for office visit in 1 month with BP diary.      Plan: Comprehensive metabolic panel, TSH with free T4        reflex, CBC with platelets, amLODIPine         (NORVASC) 5 MG tablet              
274.9

## 2019-04-24 ENCOUNTER — APPOINTMENT (OUTPATIENT)
Dept: UROLOGY | Facility: CLINIC | Age: 54
End: 2019-04-24
Payer: COMMERCIAL

## 2019-04-24 VITALS
BODY MASS INDEX: 37.25 KG/M2 | SYSTOLIC BLOOD PRESSURE: 138 MMHG | WEIGHT: 275 LBS | HEIGHT: 72 IN | DIASTOLIC BLOOD PRESSURE: 82 MMHG | HEART RATE: 78 BPM

## 2019-04-24 PROCEDURE — 99213 OFFICE O/P EST LOW 20 MIN: CPT

## 2019-04-24 RX ORDER — LISINOPRIL AND HYDROCHLOROTHIAZIDE TABLETS 10; 12.5 MG/1; MG/1
10-12.5 TABLET ORAL
Qty: 30 | Refills: 0 | Status: ACTIVE | COMMUNITY
Start: 2019-02-04

## 2019-04-24 RX ORDER — TRAMADOL HYDROCHLORIDE 50 MG/1
50 TABLET, COATED ORAL
Qty: 12 | Refills: 0 | Status: COMPLETED | COMMUNITY
Start: 2018-12-04 | End: 2019-04-24

## 2019-04-24 RX ORDER — CHLORHEXIDINE GLUCONATE, 0.12% ORAL RINSE 1.2 MG/ML
0.12 SOLUTION DENTAL
Qty: 473 | Refills: 0 | Status: COMPLETED | COMMUNITY
Start: 2019-01-15

## 2019-04-24 RX ORDER — NITROFURANTOIN (MONOHYDRATE/MACROCRYSTALS) 25; 75 MG/1; MG/1
100 CAPSULE ORAL TWICE DAILY
Qty: 6 | Refills: 0 | Status: COMPLETED | COMMUNITY
Start: 2019-01-03 | End: 2019-04-24

## 2019-04-24 RX ORDER — IBUPROFEN 600 MG/1
600 TABLET, FILM COATED ORAL
Qty: 15 | Refills: 0 | Status: COMPLETED | COMMUNITY
Start: 2019-01-15

## 2019-04-24 NOTE — HISTORY OF PRESENT ILLNESS
[FreeTextEntry1] : Carlos had been seen in October he had a right ureteral stone eventually undergoing ureteroscopy with laser lithotripsy and having the stent removed in the office a few days later. He supposed to follow up with nephrology and me is not come back for almost 4 months and tells me he saw the nephrologist recently after a metabolic workup something we had ordered a long time ago and he made changes in medication and diet. He was here now for his follow-up. He did not come in until now is there was a death in the family and it took a lot of time and energy he just did have any extra [None] : no symptoms

## 2019-04-24 NOTE — PHYSICAL EXAM
[General Appearance - Well Developed] : well developed [Well Groomed] : well groomed [Normal Appearance] : normal appearance [General Appearance - Well Nourished] : well nourished [General Appearance - In No Acute Distress] : no acute distress [Abdomen Soft] : soft [Abdomen Tenderness] : non-tender [Costovertebral Angle Tenderness] : no ~M costovertebral angle tenderness [Heart Rate And Rhythm] : Heart rate and rhythm were normal [Edema] : no peripheral edema [Respiration, Rhythm And Depth] : normal respiratory rhythm and effort [] : no respiratory distress [Auscultation Breath Sounds / Voice Sounds] : lungs were clear to auscultation bilaterally [Exaggerated Use Of Accessory Muscles For Inspiration] : no accessory muscle use [Affect] : the affect was normal [Oriented To Time, Place, And Person] : oriented to person, place, and time [Mood] : the mood was normal [Not Anxious] : not anxious [Normal Station and Gait] : the gait and station were normal for the patient's age

## 2019-04-24 NOTE — LETTER BODY
[Dear  ___] : Dear  [unfilled], [Courtesy Letter:] : I had the pleasure of seeing your patient, [unfilled], in my office today. [Please see my note below.] : Please see my note below. [Sincerely,] : Sincerely, [FreeTextEntry2] :  Rajeev Kimble MD\par [id number]\[place] [address] \par

## 2019-04-24 NOTE — ASSESSMENT
[FreeTextEntry1] : \par Physical exam shows no CVA or abdominal tenderness.\par \par I’m going to get a renal ultrasound to rule out the silent obstruction. If that’s normal he will follow up with a nephrologist. If he finds something that needs to be done i.e. follow-up so knows where symptoms show more stones will send him back. If he stays stone free than is no reason for him to come in for me to tell him he’s doing well.\par

## 2019-04-24 NOTE — LETTER HEADER
[FreeTextEntry3] : Stephanie Rodrigez M.D.\par Director of Urology\par [facility]\par [address] [place] [address], [address]\[place] [address]

## 2019-07-07 ENCOUNTER — FORM ENCOUNTER (OUTPATIENT)
Age: 54
End: 2019-07-07

## 2019-07-08 ENCOUNTER — OUTPATIENT (OUTPATIENT)
Dept: OUTPATIENT SERVICES | Facility: HOSPITAL | Age: 54
LOS: 1 days | Discharge: HOME | End: 2019-07-08
Payer: COMMERCIAL

## 2019-07-08 DIAGNOSIS — Z98.890 OTHER SPECIFIED POSTPROCEDURAL STATES: Chronic | ICD-10-CM

## 2019-07-08 DIAGNOSIS — N20.1 CALCULUS OF URETER: ICD-10-CM

## 2019-07-08 PROCEDURE — 76770 US EXAM ABDO BACK WALL COMP: CPT | Mod: 26

## 2019-08-07 ENCOUNTER — APPOINTMENT (OUTPATIENT)
Dept: UROLOGY | Facility: CLINIC | Age: 54
End: 2019-08-07
Payer: COMMERCIAL

## 2019-08-07 VITALS
SYSTOLIC BLOOD PRESSURE: 134 MMHG | HEIGHT: 72 IN | BODY MASS INDEX: 34.95 KG/M2 | HEART RATE: 84 BPM | DIASTOLIC BLOOD PRESSURE: 64 MMHG | WEIGHT: 258 LBS

## 2019-08-07 PROCEDURE — 99213 OFFICE O/P EST LOW 20 MIN: CPT

## 2019-08-07 NOTE — HISTORY OF PRESENT ILLNESS
[FreeTextEntry1] : Carlos is a 54-year-old male who we have been following for nephrolithiasis. In DEC 2018 he underwent ureteroscopy and laser lithotripsy for a right ureteral stone. Metabolic workup was done by his nephrologist and we have been co-managing him for renal stones which include change in diet and changes in his medication. He has some back pain, which radiates down his right leg however denies renal colic, gross hematuria, abdominal/flank pain, or nausea.\par \par He presents today to review his renal/bladder ultrasound. [Erectile Dysfunction] : Erectile Dysfunction

## 2019-08-07 NOTE — LETTER HEADER
[FreeTextEntry3] : Stephanie Rodrigez M.D.\par Director of Urology\par [facility]\par [address] [place] [address], [address]\[place] [address]

## 2019-08-07 NOTE — ASSESSMENT
[FreeTextEntry1] : With respect to his stone burden he is doing well and I recommend he follow-up with nephrology and if they find recurrent stones that require surgical treatment they will involve us. However once we had finish with all of that he told me that he has erectile dysfunction wants that we can help that. This is not an issue we have addressed until now.\par \par I reviewed with him that he needs check his hormones as the penis works better with someone who has normal hormones, I also need a note from his doctor rufus Bateman criteria classification, tell me that his heart is in good enough shape to start having sex.\par

## 2019-08-07 NOTE — LETTER BODY
[Dear  ___] : Dear  [unfilled], [Please see my note below.] : Please see my note below. [Courtesy Letter:] : I had the pleasure of seeing your patient, [unfilled], in my office today. [Sincerely,] : Sincerely, [FreeTextEntry2] :  Rajeev Kimble MD\par [id number]\[place] [address] \par

## 2019-08-07 NOTE — PHYSICAL EXAM
[General Appearance - Well Developed] : well developed [General Appearance - Well Nourished] : well nourished [Well Groomed] : well groomed [Normal Appearance] : normal appearance [General Appearance - In No Acute Distress] : no acute distress [Abdomen Soft] : soft [Abdomen Tenderness] : non-tender [Costovertebral Angle Tenderness] : no ~M costovertebral angle tenderness [Edema] : no peripheral edema [] : no respiratory distress [Respiration, Rhythm And Depth] : normal respiratory rhythm and effort [Exaggerated Use Of Accessory Muscles For Inspiration] : no accessory muscle use [Affect] : the affect was normal [Oriented To Time, Place, And Person] : oriented to person, place, and time [Mood] : the mood was normal [Not Anxious] : not anxious [Normal Station and Gait] : the gait and station were normal for the patient's age [No Focal Deficits] : no focal deficits

## 2019-10-17 ENCOUNTER — APPOINTMENT (OUTPATIENT)
Dept: UROLOGY | Facility: CLINIC | Age: 54
End: 2019-10-17
Payer: COMMERCIAL

## 2019-10-17 VITALS
HEIGHT: 72 IN | HEART RATE: 70 BPM | WEIGHT: 258 LBS | BODY MASS INDEX: 34.95 KG/M2 | DIASTOLIC BLOOD PRESSURE: 86 MMHG | SYSTOLIC BLOOD PRESSURE: 163 MMHG

## 2019-10-17 DIAGNOSIS — N20.0 CALCULUS OF KIDNEY: ICD-10-CM

## 2019-10-17 PROCEDURE — 99213 OFFICE O/P EST LOW 20 MIN: CPT

## 2019-10-17 RX ORDER — CEFUROXIME AXETIL 500 MG/1
500 TABLET ORAL
Qty: 14 | Refills: 0 | Status: COMPLETED | COMMUNITY
Start: 2018-10-17 | End: 2019-10-17

## 2019-10-17 NOTE — ASSESSMENT
[FreeTextEntry1] : Review of the options regarding his erectile dysfunction is electing to begin sildenafil 100 mg, cutting the tablet in half at first, taking the dose one hour prior to intercourse. All usage, dosage, mechanism of action, and adverse events reviewed. He understands not to take more than 100 mg in a 24-hour period.\par \par We discussed the importance of followup with nephrology regarding his history of renal stones. He will do so. He'll follow up in the weeks for symptom and possible discussion of other options

## 2019-10-17 NOTE — HISTORY OF PRESENT ILLNESS
[Erectile Dysfunction] : Erectile Dysfunction [FreeTextEntry1] : Carlos is a 54-year-old male who we have been following for nephrolithiasis and ED. He has been followed by his nephrologist who has been managing him for renal stones but has been relatively non complaint with f/u. He reports that he passed a stone about a month ago. He denies renal colic, gross hematuria, abdominal/flank pain, or nausea.\par \par He has a history of ED and presents today to review his Wykoff criteria and blood work.

## 2019-10-17 NOTE — LETTER BODY
[Dear  ___] : Dear  [unfilled], [Please see my note below.] : Please see my note below. [Sincerely,] : Sincerely, [Courtesy Letter:] : I had the pleasure of seeing your patient, [unfilled], in my office today. [FreeTextEntry2] :  Rajeev Kimble MD\par [id number]\[place] [address] \par

## 2019-10-17 NOTE — LETTER HEADER
[FreeTextEntry3] : Stephanie Rodrigez M.D.\par Director of Urology\par [facility]\par [address] [place] [address], [address]\[place] [address]

## 2019-10-17 NOTE — PHYSICAL EXAM
[General Appearance - Well Developed] : well developed [General Appearance - Well Nourished] : well nourished [Normal Appearance] : normal appearance [Well Groomed] : well groomed [General Appearance - In No Acute Distress] : no acute distress [Edema] : no peripheral edema [Exaggerated Use Of Accessory Muscles For Inspiration] : no accessory muscle use [] : no respiratory distress [Respiration, Rhythm And Depth] : normal respiratory rhythm and effort [Mood] : the mood was normal [Affect] : the affect was normal [Oriented To Time, Place, And Person] : oriented to person, place, and time [Normal Station and Gait] : the gait and station were normal for the patient's age [Not Anxious] : not anxious [No Focal Deficits] : no focal deficits

## 2019-12-12 ENCOUNTER — APPOINTMENT (OUTPATIENT)
Dept: UROLOGY | Facility: CLINIC | Age: 54
End: 2019-12-12
Payer: COMMERCIAL

## 2019-12-12 VITALS
DIASTOLIC BLOOD PRESSURE: 71 MMHG | HEART RATE: 96 BPM | HEIGHT: 72 IN | SYSTOLIC BLOOD PRESSURE: 132 MMHG | BODY MASS INDEX: 34.95 KG/M2 | WEIGHT: 258 LBS

## 2019-12-12 DIAGNOSIS — N20.1 CALCULUS OF URETER: ICD-10-CM

## 2019-12-12 PROCEDURE — 99213 OFFICE O/P EST LOW 20 MIN: CPT

## 2019-12-12 RX ORDER — SILDENAFIL 100 MG/1
100 TABLET, FILM COATED ORAL
Qty: 10 | Refills: 2 | Status: COMPLETED | OUTPATIENT
Start: 2019-10-17 | End: 2019-12-12

## 2019-12-12 RX ORDER — TADALAFIL 20 MG/1
20 TABLET ORAL
Qty: 10 | Refills: 2 | Status: ACTIVE | OUTPATIENT
Start: 2019-12-12

## 2019-12-12 NOTE — LETTER HEADER
[FreeTextEntry3] : Stephanie Rodrigez M.D.\par Director of Urology\par [facility]\par [address] [place] [address], [address]\[place] [address]

## 2019-12-12 NOTE — LETTER BODY
[Dear  ___] : Dear  [unfilled], [Sincerely,] : Sincerely, [Please see my note below.] : Please see my note below. [Courtesy Letter:] : I had the pleasure of seeing your patient, [unfilled], in my office today. [FreeTextEntry2] :  Rajeev Kimble MD\par [address]\[place] [address] \par

## 2019-12-12 NOTE — HISTORY OF PRESENT ILLNESS
[FreeTextEntry1] : Carlos was seen on October 17, 2019. We prescribe sildenafil at the of 200 mg dose. He tells me he’s tried her numerous times with at least with at least in our lead time on an empty stomach and a gave him some small effect but it wasn’t enough penetration. Our with respect to his kidney stones he says nephrologist told him he was doing well I had thought he was going to call him and find out when he should come back for follow-up. He had a rather complicated course requiring stents eventually going back to take out the stone and then going back take out the stent and I’d rather he not have to go back again. Prevention is the best way not to get another stone treatment. [Erectile Dysfunction] : Erectile Dysfunction

## 2019-12-12 NOTE — PHYSICAL EXAM
[General Appearance - Well Nourished] : well nourished [Normal Appearance] : normal appearance [General Appearance - Well Developed] : well developed [General Appearance - In No Acute Distress] : no acute distress [Well Groomed] : well groomed [Respiration, Rhythm And Depth] : normal respiratory rhythm and effort [] : no respiratory distress [Affect] : the affect was normal [Oriented To Time, Place, And Person] : oriented to person, place, and time [Exaggerated Use Of Accessory Muscles For Inspiration] : no accessory muscle use [Not Anxious] : not anxious [Mood] : the mood was normal [Normal Station and Gait] : the gait and station were normal for the patient's age

## 2020-02-20 ENCOUNTER — APPOINTMENT (OUTPATIENT)
Dept: UROLOGY | Facility: CLINIC | Age: 55
End: 2020-02-20
Payer: COMMERCIAL

## 2020-02-20 VITALS — HEIGHT: 72 IN | WEIGHT: 258 LBS | BODY MASS INDEX: 34.95 KG/M2

## 2020-02-20 DIAGNOSIS — N20.0 CALCULUS OF KIDNEY: ICD-10-CM

## 2020-02-20 PROCEDURE — 99213 OFFICE O/P EST LOW 20 MIN: CPT | Mod: 25

## 2020-02-20 PROCEDURE — 93980 PENILE VASCULAR STUDY: CPT

## 2020-02-20 PROCEDURE — 54235 NJX CORPORA CAVERNOSA RX AGT: CPT

## 2020-02-20 NOTE — LETTER BODY
[Dear  ___] : Dear  [unfilled], [Courtesy Letter:] : I had the pleasure of seeing your patient, [unfilled], in my office today. [FreeTextEntry2] :  Rajeev Kimble MD\par [address]\[place] [address] \par  [Please see my note below.] : Please see my note below. [Sincerely,] : Sincerely,

## 2020-02-20 NOTE — LETTER HEADER
[FreeTextEntry3] : Stephanie Rodrigez M.D.\par Director of Urology\par [facility]\par [address] [place] [address], [address]\[place] [address]

## 2020-02-20 NOTE — ASSESSMENT
[FreeTextEntry1] : The ultrasound study was done see that note listed separately. It showed venoocclusive dysfunction. He got up to about the same degree rigidity he gets with Viagra however when I put a ring at the base it lasted. When I took the ring off it happened what happens at home it goes away. Theoretically he can end up using Viagra with a ring or possibly just sexual stimulation with the ring as he still finds his wife attractive and wants to have sex. If this works well enough either the ring by itself for the ring with Viagra all well and good if not his can have to consider an implant or none penis vaginal sex.

## 2020-02-20 NOTE — HISTORY OF PRESENT ILLNESS
[FreeTextEntry1] : Carlos was last seen December 12 we decided to try Cialis and if that doesn’t work to proceed with a Doppler study. He comes in today telling me that Cialis works even less well than the Viagra did he almost no reaction. He wants to go ahead with the Doppler so we can see what’s wrong and get ideas on how to enable them to once again have sufficient rigidity for vaginal penetration.\par \par With respect to his kidney stones he was supposed to call the nephrologist he did not and I discussed with him these are two separate issues and should be managed concurrently not sequentially. The real shame if all went to get a penis that worked he ended up with a kidney stone. He says he will call the nephrologist and see what’s happening. His last ultrasound was July 2019 we thought that the nephrologist to he was supposed to see what arrange a pick given that he never saw him it’s hard for to be arranged.\par

## 2020-02-24 ENCOUNTER — FORM ENCOUNTER (OUTPATIENT)
Age: 55
End: 2020-02-24

## 2020-02-25 ENCOUNTER — OUTPATIENT (OUTPATIENT)
Dept: OUTPATIENT SERVICES | Facility: HOSPITAL | Age: 55
LOS: 1 days | Discharge: HOME | End: 2020-02-25
Payer: COMMERCIAL

## 2020-02-25 DIAGNOSIS — Z98.890 OTHER SPECIFIED POSTPROCEDURAL STATES: Chronic | ICD-10-CM

## 2020-02-25 DIAGNOSIS — N20.0 CALCULUS OF KIDNEY: ICD-10-CM

## 2020-02-25 PROCEDURE — 76775 US EXAM ABDO BACK WALL LIM: CPT | Mod: 26

## 2020-03-26 ENCOUNTER — APPOINTMENT (OUTPATIENT)
Dept: UROLOGY | Facility: CLINIC | Age: 55
End: 2020-03-26

## 2020-04-15 ENCOUNTER — TRANSCRIPTION ENCOUNTER (OUTPATIENT)
Age: 55
End: 2020-04-15

## 2021-11-01 ENCOUNTER — APPOINTMENT (OUTPATIENT)
Dept: UROLOGY | Facility: CLINIC | Age: 56
End: 2021-11-01
Payer: COMMERCIAL

## 2021-11-01 VITALS
WEIGHT: 302 LBS | BODY MASS INDEX: 40.9 KG/M2 | HEART RATE: 94 BPM | DIASTOLIC BLOOD PRESSURE: 86 MMHG | SYSTOLIC BLOOD PRESSURE: 150 MMHG | HEIGHT: 72 IN

## 2021-11-01 DIAGNOSIS — N52.9 MALE ERECTILE DYSFUNCTION, UNSPECIFIED: ICD-10-CM

## 2021-11-01 DIAGNOSIS — I25.10 ATHEROSCLEROTIC HEART DISEASE OF NATIVE CORONARY ARTERY W/OUT ANGINA PECTORIS: ICD-10-CM

## 2021-11-01 DIAGNOSIS — B35.6 TINEA CRURIS: ICD-10-CM

## 2021-11-01 DIAGNOSIS — N20.0 CALCULUS OF KIDNEY: ICD-10-CM

## 2021-11-01 PROCEDURE — 99214 OFFICE O/P EST MOD 30 MIN: CPT

## 2021-11-01 RX ORDER — CLOTRIMAZOLE 10 MG/G
1 CREAM TOPICAL
Qty: 1 | Refills: 2 | Status: ACTIVE | COMMUNITY
Start: 2021-11-01 | End: 1900-01-01

## 2021-11-01 NOTE — ASSESSMENT
[FreeTextEntry1] : With respect to his stones he has not had any testing in almost 2 years we will get a CAT scan CT stone protocol\par \par As well as metabolic work-up fact that was terrible we will get a repeated x1 I may want to go back to the nephrologist\par \par With respect to his erections his testicles are normal in size but his hormones back in August 2019 had borderline low testosterone.  We will repeat them and if they are normal I will have him see Dr. Hair to consider an implant.  Please note we discussed consideration of injections with the ring something in the past he declined and he is not interested in injecting himself every time he wants to have relations

## 2021-11-01 NOTE — LETTER BODY
[Please see my note below.] : Please see my note below. [Sincerely,] : Sincerely, [FreeTextEntry2] : Does not have one right now\par

## 2021-11-01 NOTE — HISTORY OF PRESENT ILLNESS
[Erectile Dysfunction] : Erectile Dysfunction [FreeTextEntry1] : Carlos is a 56-year-old male born February 8, 1965 last seen on February 20, 2020.  At that time we have given him Cialis was not working great neither was sildenafil so we went to a Doppler study which showed venoocclusive dysfunction.  He ended up using Viagra with a ring in for a while at work he is telling me that she is not working anymore as well either.  He wants to know what other options are there.\par \par He has a history of kidney stones he was supposed to contact the nephrologist.  He tells me he did the nephrologist that everything was fine some but I find a little hard to believe is it was fine he never went to me the stone of the first place.  Please note he had ureteroscopy back in 2018, he has not had any testing done in a while we do not know if he has any more stones.  On his last ultrasound done back in February 2020 he had bilateral small stones and he tells me nothing his past similar listed past without pain there is still there.\par \par Please note his metabolic work-up back in March 2019 showed almost no citrate very acid urine and a high supersaturation of uric acid with a very high phosphorus of 2.  Again I find it hard to believe the nephrologist said do not do anything

## 2021-11-01 NOTE — LETTER HEADER
[FreeTextEntry3] : Stephanie Rodrigez M.D.\par Director of Urology\par [facility]\par [address] [place] [address], [address]\[place] [address]

## 2021-11-01 NOTE — PHYSICAL EXAM
[General Appearance - Well Developed] : well developed [General Appearance - Well Nourished] : well nourished [Normal Appearance] : normal appearance [Well Groomed] : well groomed [General Appearance - In No Acute Distress] : no acute distress [Abdomen Soft] : soft [Abdomen Tenderness] : non-tender [Abdomen Hernia] : no hernia was discovered [Costovertebral Angle Tenderness] : no ~M costovertebral angle tenderness [Urethral Meatus] : meatus normal [Penis Abnormality] : normal uncircumcised penis [Epididymis] : the epididymides were normal [Testes Tenderness] : no tenderness of the testes [Heart Rate And Rhythm] : Heart rate and rhythm were normal [Edema] : no peripheral edema [] : no respiratory distress [Respiration, Rhythm And Depth] : normal respiratory rhythm and effort [Exaggerated Use Of Accessory Muscles For Inspiration] : no accessory muscle use [Auscultation Breath Sounds / Voice Sounds] : lungs were clear to auscultation bilaterally [Oriented To Time, Place, And Person] : oriented to person, place, and time [Affect] : the affect was normal [Mood] : the mood was normal [Not Anxious] : not anxious [Normal Station and Gait] : the gait and station were normal for the patient's age [FreeTextEntry1] : Deep tendon reflexes were acceptable

## 2021-12-13 ENCOUNTER — APPOINTMENT (OUTPATIENT)
Dept: UROLOGY | Facility: CLINIC | Age: 56
End: 2021-12-13

## 2022-02-04 ENCOUNTER — OUTPATIENT (OUTPATIENT)
Dept: OUTPATIENT SERVICES | Facility: HOSPITAL | Age: 57
LOS: 1 days | Discharge: HOME | End: 2022-02-04
Payer: COMMERCIAL

## 2022-02-04 DIAGNOSIS — Z98.890 OTHER SPECIFIED POSTPROCEDURAL STATES: Chronic | ICD-10-CM

## 2022-02-04 DIAGNOSIS — N20.0 CALCULUS OF KIDNEY: ICD-10-CM

## 2022-02-04 PROCEDURE — 74176 CT ABD & PELVIS W/O CONTRAST: CPT | Mod: 26

## 2022-02-07 ENCOUNTER — APPOINTMENT (OUTPATIENT)
Dept: UROLOGY | Facility: CLINIC | Age: 57
End: 2022-02-07

## 2023-11-02 NOTE — PATIENT PROFILE ADULT - NSPROSPHOSPCHAPLAINYN_GEN_A_NUR
30 y F w PMHx of lupus/ Sjogren's disease, is on Plaquenil, presented to ED at 15:35 hours with complaints of severe occipital headache started around 9 AM yesterday, got progressively worse, associated with mild nausea, she woke up at 4:30 AM this morning, noted swelling of both knees, further on she also noted blurry vision, facial numbness and slight left hand numbness, she came to ED, CT head done revealed no acute findings, patient was administered a cocktail for headache, she reports the headache has reduced from 9/10 in AM to 5/10 currently. Patient reports resolution of left hand numbness and visual blurring, she continues to have slight numbness left side of face.    # Most likely migraine with aura, new onset    # Less likely CVA/stroke, NIHSS 1 for slight left facial numbness, MRI/MRA of the brain unremarkable for acute stroke or vasculitis.  MR venogram brain no evidence of cortical sinus thrombosis       -As discussed with the patient, can follow-up with neurology as outpatient for management of migraine headaches    Above D/W pt and Dr. López     no

## 2023-12-13 NOTE — ASSESSMENT
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 get into a fat burning state. She will see what works best for her. IN depth conversation regarding what it means to be fat adaptive. She uses a juice when she feels ill. I recommended her sitting for 10-15 minutes have a high fat meal and water and see if she regulates    She will stop cortisol eaze    [place] test was ordered to evaluate hormones and cortisol curve    She will follow up after lab testing is done     1. Other fatigue  - Comp Metabolic Panel (14); Future  - CBC With Differential With Platelet; Future  - Vitamin D; Future    2. Hypothyroidism, unspecified type  - Free T3 (Triiodothryronine); Future  - Reverse T3, Serum; Future  - Thyroid Antithyroglobulin AB; Future  - Free T4, (Free Thyroxine); Future  - Assay, Thyroid Stim Hormone; Future  - Thyroid Peroxidase (TPO) AB; Future    3. Menopause  - Pregnenolone by MS/MS, Serum; Future  - Progesterone; Future  - Testosterone,Total and Weakly Bound w/ SHBG; Future  - Prolactin; Future  - LH Fertility; Future  - FSH; Future  - Estrone, Serum; Future  - Estradiol; Future  - Dehydroepiandrosterone Sulfate; Future    4.  Anxiety  - Cortisol [E]; Future    5. Stress  - Cortisol [E]; Future    6. Class 1 obesity without serious comorbidity with body mass index (BMI) of 32.0 to 32.9 in adult, unspecified obesity type  - Insulin; Future  - Comp Metabolic Panel (14); Future      Time spent with patient: Over 60 minutes spent in chart review and in direct communication with patient obtaining and reviewing history, creating a unique care plan, explaining the rationale for treatment, reviewing potential SE and overall treatment plan,  documenting all clinical information in Epic. Over 50% of this time was in education, counseling and coordination of care.      Problem List Items Addressed This Visit          Endocrine and Metabolic    Hypothyroidism    Relevant Orders    Free T3 (Triiodothryronine)    Reverse T3, Serum    Thyroid Antithyroglobulin AB    Free T4, (Free Thyroxine)    Assay, Thyroid Stim Hormone    Thyroid Peroxidase (TPO) AB       Mental Health    Stress    Relevant Orders    Cortisol [E]       Symptoms and Signs    Fatigue - Primary    Relevant Orders    Comp Metabolic Panel (14)    CBC With Differential With Platelet    Vitamin D     Other Visit Diagnoses       Menopause        Relevant Orders    Pregnenolone by MS/MS, Serum    Progesterone    Testosterone,Total and Weakly Bound w/ SHBG    Prolactin    LH Fertility    FSH    Estrone, Serum    Estradiol    Dehydroepiandrosterone Sulfate    Anxiety        Relevant Orders    Cortisol [E]    Class 1 obesity without serious comorbidity with body mass index (BMI) of 32.0 to 32.9 in adult, unspecified obesity type        Relevant Orders    Insulin    Comp Metabolic Panel (14)             Orders Placed This Visit:  Orders Placed This Encounter   Procedures    Free T3 (Triiodothryronine)    Reverse T3, Serum    Thyroid Antithyroglobulin AB    Free T4, (Free Thyroxine)    Assay, Thyroid Stim Hormone    Thyroid Peroxidase (TPO) AB    Insulin    Comp Metabolic Panel (14)    CBC With Differential With Platelet    Vitamin D    Pregnenolone by MS/MS, Serum    Progesterone    Testosterone,Total and Weakly Bound w/ SHBG    Prolactin    LH Fertility    FSH    Estrone, Serum    Estradiol    Dehydroepiandrosterone Sulfate    Cortisol [E]     No orders of the defined types were placed in this encounter. Patient Instructions   1) get blood work fasting    2) Start lexapro    3) hold cortisol Eaze - may start ashwagadha at 500-1000 twice a day     4) email from [address] [place], [place] [id number] test    5) counselor about ways to set boundaries   6) deep breathing box breathing twice a day in the shower and before falling asleep 30-1 minute. 7) limit the extremely inflammatory things     8) consider restarting acupuncture     No follow-ups on file. Patient affirmed understanding of plan and all questions were answered.      Blanca Marin PA-C